# Patient Record
Sex: FEMALE | Race: WHITE | NOT HISPANIC OR LATINO | Employment: FULL TIME | RURAL
[De-identification: names, ages, dates, MRNs, and addresses within clinical notes are randomized per-mention and may not be internally consistent; named-entity substitution may affect disease eponyms.]

---

## 2019-10-26 ENCOUNTER — HISTORICAL (OUTPATIENT)
Dept: ADMINISTRATIVE | Facility: HOSPITAL | Age: 29
End: 2019-10-26

## 2019-10-30 LAB
LAB AP CLINICAL INFORMATION: NORMAL
LAB AP COMMENTS: NORMAL
LAB AP DIAGNOSIS - HISTORICAL: NORMAL
LAB AP GROSS PATHOLOGY - HISTORICAL: NORMAL
LAB AP SPECIMEN SUBMITTED - HISTORICAL: NORMAL

## 2022-05-23 ENCOUNTER — HOSPITAL ENCOUNTER (OUTPATIENT)
Dept: RADIOLOGY | Facility: HOSPITAL | Age: 32
Discharge: HOME | End: 2022-05-23
Attending: NURSE PRACTITIONER
Payer: COMMERCIAL

## 2022-05-23 DIAGNOSIS — R52 PAIN: ICD-10-CM

## 2022-05-23 DIAGNOSIS — R52 PAIN: Primary | ICD-10-CM

## 2022-05-23 PROCEDURE — 73560 X-RAY EXAM OF KNEE 1 OR 2: CPT | Mod: TC,LT

## 2022-09-08 DIAGNOSIS — R10.2 PELVIC PAIN: Primary | ICD-10-CM

## 2022-09-19 ENCOUNTER — HOSPITAL ENCOUNTER (OUTPATIENT)
Dept: RADIOLOGY | Facility: HOSPITAL | Age: 32
Discharge: HOME | End: 2022-09-19
Attending: ADVANCED PRACTICE MIDWIFE
Payer: COMMERCIAL

## 2022-09-19 ENCOUNTER — OFFICE VISIT (OUTPATIENT)
Dept: OBSTETRICS AND GYNECOLOGY | Facility: CLINIC | Age: 32
End: 2022-09-19
Payer: COMMERCIAL

## 2022-09-19 VITALS
SYSTOLIC BLOOD PRESSURE: 119 MMHG | WEIGHT: 212.63 LBS | BODY MASS INDEX: 34.17 KG/M2 | HEIGHT: 66 IN | DIASTOLIC BLOOD PRESSURE: 90 MMHG | HEART RATE: 94 BPM | RESPIRATION RATE: 16 BRPM

## 2022-09-19 DIAGNOSIS — R10.2 PELVIC PAIN: ICD-10-CM

## 2022-09-19 DIAGNOSIS — Z87.42 HISTORY OF OVARIAN CYST: Primary | ICD-10-CM

## 2022-09-19 DIAGNOSIS — Z97.5 PRESENCE OF INTRAUTERINE CONTRACEPTIVE DEVICE (IUD): ICD-10-CM

## 2022-09-19 PROCEDURE — 76856 US EXAM PELVIC COMPLETE: CPT | Mod: 26,,, | Performed by: STUDENT IN AN ORGANIZED HEALTH CARE EDUCATION/TRAINING PROGRAM

## 2022-09-19 PROCEDURE — 76856 US PELVIS COMP WITH TRANSVAG NON-OB (XPD): ICD-10-PCS | Mod: 26,,, | Performed by: STUDENT IN AN ORGANIZED HEALTH CARE EDUCATION/TRAINING PROGRAM

## 2022-09-19 PROCEDURE — 3008F PR BODY MASS INDEX (BMI) DOCUMENTED: ICD-10-PCS | Mod: ,,, | Performed by: ADVANCED PRACTICE MIDWIFE

## 2022-09-19 PROCEDURE — 99213 OFFICE O/P EST LOW 20 MIN: CPT | Mod: PBBFAC,25 | Performed by: ADVANCED PRACTICE MIDWIFE

## 2022-09-19 PROCEDURE — 3080F PR MOST RECENT DIASTOLIC BLOOD PRESSURE >= 90 MM HG: ICD-10-PCS | Mod: ,,, | Performed by: ADVANCED PRACTICE MIDWIFE

## 2022-09-19 PROCEDURE — 76830 US PELVIS COMP WITH TRANSVAG NON-OB (XPD): ICD-10-PCS | Mod: 26,,, | Performed by: STUDENT IN AN ORGANIZED HEALTH CARE EDUCATION/TRAINING PROGRAM

## 2022-09-19 PROCEDURE — 3008F BODY MASS INDEX DOCD: CPT | Mod: ,,, | Performed by: ADVANCED PRACTICE MIDWIFE

## 2022-09-19 PROCEDURE — 3074F PR MOST RECENT SYSTOLIC BLOOD PRESSURE < 130 MM HG: ICD-10-PCS | Mod: ,,, | Performed by: ADVANCED PRACTICE MIDWIFE

## 2022-09-19 PROCEDURE — 76830 TRANSVAGINAL US NON-OB: CPT | Mod: 26,,, | Performed by: STUDENT IN AN ORGANIZED HEALTH CARE EDUCATION/TRAINING PROGRAM

## 2022-09-19 PROCEDURE — 3080F DIAST BP >= 90 MM HG: CPT | Mod: ,,, | Performed by: ADVANCED PRACTICE MIDWIFE

## 2022-09-19 PROCEDURE — 3074F SYST BP LT 130 MM HG: CPT | Mod: ,,, | Performed by: ADVANCED PRACTICE MIDWIFE

## 2022-09-19 PROCEDURE — 1159F MED LIST DOCD IN RCRD: CPT | Mod: ,,, | Performed by: ADVANCED PRACTICE MIDWIFE

## 2022-09-19 PROCEDURE — 99212 PR OFFICE/OUTPT VISIT, EST, LEVL II, 10-19 MIN: ICD-10-PCS | Mod: S$PBB,,, | Performed by: ADVANCED PRACTICE MIDWIFE

## 2022-09-19 PROCEDURE — 99212 OFFICE O/P EST SF 10 MIN: CPT | Mod: S$PBB,,, | Performed by: ADVANCED PRACTICE MIDWIFE

## 2022-09-19 PROCEDURE — 1159F PR MEDICATION LIST DOCUMENTED IN MEDICAL RECORD: ICD-10-PCS | Mod: ,,, | Performed by: ADVANCED PRACTICE MIDWIFE

## 2022-09-19 PROCEDURE — 76830 TRANSVAGINAL US NON-OB: CPT | Mod: TC

## 2022-09-19 RX ORDER — VALACYCLOVIR HYDROCHLORIDE 1 G/1
1000 TABLET, FILM COATED ORAL DAILY
COMMUNITY
Start: 2022-04-11

## 2022-09-19 RX ORDER — FLUTICASONE PROPIONATE 50 MCG
SPRAY, SUSPENSION (ML) NASAL
COMMUNITY
Start: 2022-07-14

## 2022-09-19 RX ORDER — FLUOXETINE HYDROCHLORIDE 40 MG/1
40 CAPSULE ORAL DAILY
COMMUNITY
Start: 2022-09-10

## 2022-09-19 RX ORDER — CLONAZEPAM 0.5 MG/1
TABLET ORAL
COMMUNITY
Start: 2022-08-11

## 2022-09-19 NOTE — PROGRESS NOTES
Subjective:       Patient ID: Claudia Navarro is a 32 y.o. female.    Chief Complaint: Follow-up (Patient is here for pelvic ultrasound results.)    In for follow up ultrasound due to hx mucinous cystadenoma.  Denies complaints today.  Doing well with IUD and reports occasional spotting.  Has an appt with psychology associates for ADHD testing.     Follow-up    Review of Systems   Constitutional: Negative.    HENT: Negative.     Eyes: Negative.    Respiratory: Negative.     Cardiovascular: Negative.    Gastrointestinal: Negative.    Genitourinary: Negative.    Musculoskeletal: Negative.    Integumentary:  Negative.   Neurological: Negative.    Psychiatric/Behavioral:  The patient is nervous/anxious.         High stress levels, anxiety       Objective:      Physical Exam  Vitals reviewed.   Constitutional:       Appearance: Normal appearance.   HENT:      Head: Normocephalic.   Cardiovascular:      Rate and Rhythm: Normal rate and regular rhythm.   Pulmonary:      Effort: Pulmonary effort is normal.   Chest:   Breasts:     Right: Normal. No mass.      Left: Normal. No mass.   Abdominal:      General: Abdomen is flat.      Palpations: Abdomen is soft.   Musculoskeletal:         General: Normal range of motion.      Cervical back: Normal range of motion.   Skin:     General: Skin is warm and dry.   Neurological:      Mental Status: She is alert and oriented to person, place, and time.   Psychiatric:         Mood and Affect: Mood normal.         Behavior: Behavior normal.       Assessment:       Problem List Items Addressed This Visit    None  Visit Diagnoses       History of ovarian cyst    -  Primary    Presence of intrauterine contraceptive device (IUD)                  Plan:       Annual Exam scheduled in December.  Previous records requested from [facility] for review and last pap results.

## 2022-10-17 ENCOUNTER — TELEPHONE (OUTPATIENT)
Dept: OBSTETRICS AND GYNECOLOGY | Facility: CLINIC | Age: 32
End: 2022-10-17
Payer: COMMERCIAL

## 2022-10-17 NOTE — TELEPHONE ENCOUNTER
Tried to call pt again. 10/17/22 4:00p.m.  Called pt back and left a voicemail. ----- Message from Faustina Mac CNM sent at 10/17/2022  9:51 AM CDT -----  Please call and get more info  ----- Message -----  From: Randi Banegas  Sent: 10/17/2022   9:51 AM CDT  To: Faustina Mac CNM    Waiting on a call back since Tuesday  [phone]

## 2023-01-05 ENCOUNTER — TELEPHONE (OUTPATIENT)
Dept: OBSTETRICS AND GYNECOLOGY | Facility: CLINIC | Age: 33
End: 2023-01-05
Payer: COMMERCIAL

## 2023-01-05 NOTE — TELEPHONE ENCOUNTER
----- Message from Randi Banegas sent at 1/4/2023  4:09 PM CST -----  Patient is wanting / waiting on a call about her records being transferred to another dr.     Will you please give her a call at [phone]

## 2024-04-10 ENCOUNTER — OFFICE VISIT (OUTPATIENT)
Dept: FAMILY MEDICINE | Facility: CLINIC | Age: 34
End: 2024-04-10
Payer: COMMERCIAL

## 2024-04-10 VITALS
DIASTOLIC BLOOD PRESSURE: 84 MMHG | BODY MASS INDEX: 34.84 KG/M2 | SYSTOLIC BLOOD PRESSURE: 116 MMHG | HEIGHT: 66 IN | HEART RATE: 103 BPM | WEIGHT: 216.81 LBS

## 2024-04-10 DIAGNOSIS — H90.11 CONDUCTIVE HEARING LOSS OF RIGHT EAR, UNSPECIFIED HEARING STATUS ON CONTRALATERAL SIDE: Chronic | ICD-10-CM

## 2024-04-10 DIAGNOSIS — H91.92 DECREASED HEARING OF LEFT EAR: ICD-10-CM

## 2024-04-10 DIAGNOSIS — H65.92 SOM (SECRETORY OTITIS MEDIA), LEFT: Primary | ICD-10-CM

## 2024-04-10 PROCEDURE — 3074F SYST BP LT 130 MM HG: CPT | Mod: ,,, | Performed by: FAMILY MEDICINE

## 2024-04-10 PROCEDURE — 99203 OFFICE O/P NEW LOW 30 MIN: CPT | Mod: ,,, | Performed by: FAMILY MEDICINE

## 2024-04-10 PROCEDURE — 3079F DIAST BP 80-89 MM HG: CPT | Mod: ,,, | Performed by: FAMILY MEDICINE

## 2024-04-10 PROCEDURE — 1159F MED LIST DOCD IN RCRD: CPT | Mod: ,,, | Performed by: FAMILY MEDICINE

## 2024-04-10 PROCEDURE — 3008F BODY MASS INDEX DOCD: CPT | Mod: ,,, | Performed by: FAMILY MEDICINE

## 2024-04-10 PROCEDURE — 1160F RVW MEDS BY RX/DR IN RCRD: CPT | Mod: ,,, | Performed by: FAMILY MEDICINE

## 2024-04-10 RX ORDER — DEXAMETHASONE 0.75 MG/1
TABLET ORAL
Qty: 15 TABLET | Refills: 0 | Status: SHIPPED | OUTPATIENT
Start: 2024-04-10

## 2024-04-10 RX ORDER — GUAIFENESIN, PSEUDOEPHEDRINE HYDROCHLORIDE 600; 60 MG/1; MG/1
1 TABLET, EXTENDED RELEASE ORAL 2 TIMES DAILY
Qty: 18 TABLET | Refills: 0 | Status: SHIPPED | OUTPATIENT
Start: 2024-04-10 | End: 2024-05-10

## 2024-04-10 RX ORDER — FLUTICASONE PROPIONATE 50 MCG
2 SPRAY, SUSPENSION (ML) NASAL DAILY
Qty: 16 G | Refills: 0 | Status: SHIPPED | OUTPATIENT
Start: 2024-04-10 | End: 2024-05-10

## 2024-04-10 RX ORDER — DEXTROAMPHETAMINE SACCHARATE, AMPHETAMINE ASPARTATE, DEXTROAMPHETAMINE SULFATE AND AMPHETAMINE SULFATE 7.5; 7.5; 7.5; 7.5 MG/1; MG/1; MG/1; MG/1
1 TABLET ORAL 2 TIMES DAILY
COMMUNITY
Start: 2024-03-15

## 2024-04-10 NOTE — PROGRESS NOTES
Yinka Vicente MD   [facility]  [facility] [address] [address]  [phone]      PATIENT NAME: Claudia Navarro  : 1990  DATE: 4/10/24  MRN: [id number]      Billing Provider: Yinka Vicente MD  Level of Service:   Patient PCP Information       Provider PCP Type    Yinka Vicente MD General            Reason for Visit / Chief Complaint: decreased hearing (Complains of decreased hearing, left ear. Denies otalgia. )       Update PCP  Update Chief Complaint         History of Present Illness / Problem Focused Workflow     Claudia Navarro presents to the clinic with decreased hearing (Complains of decreased hearing, left ear. Denies otalgia. )       35 yo WF who reports that she had COVID or flu back around [place] .  Ever since then she has had problems with pressure and decreased hearing in left ear.  She has a history of problems in her right ear and has had surgery in the past.  She has conductive hearing loss and wears a hearing aid in R ear.  She reports that her left ear usually does not give her any problems.  She has tried mucinex, nasal decongestant spray, antibiotic ear drops and prednisone tablets.  Has not gotten any relief.          Review of Systems     Review of Systems   Constitutional:  Negative for activity change, chills and fever.   HENT:  Positive for nasal congestion and ear pain (pressure in left ear). Negative for sore throat and tinnitus.         Decreased hearing in left ear (subacute)  Decreased hearing right ear (chronic)   Eyes:  Negative for pain.   Respiratory:  Negative for cough, chest tightness and shortness of breath.    Cardiovascular:  Negative for chest pain and palpitations.   Gastrointestinal:  Negative for abdominal pain.   Neurological:  Negative for dizziness, syncope and weakness.   Psychiatric/Behavioral:  Negative for confusion.         Medical / Social / Family History      Past Medical History:   Diagnosis Date    Does use hearing aid     Mucinous cystadenoma of ovary     PCOS (polycystic ovarian syndrome)        Past Surgical History:   Procedure Laterality Date     SECTION         Social History    reports that she has never smoked. She has never used smokeless tobacco. She reports that she does not currently use alcohol. She reports that she does not use drugs.   Social History     Tobacco Use    Smoking status: Never    Smokeless tobacco: Never   Substance Use Topics    Alcohol use: Not Currently    Drug use: Never       Family History  Family History   Problem Relation Age of Onset    Schizophrenia Maternal Grandmother     Schizophrenia Maternal Grandfather     Bipolar disorder Father     Anxiety disorder Father     Hypertension Mother     Bipolar disorder Mother     Schizophrenia Mother        Medications and Allergies     Medications  Outpatient Medications Marked as Taking for the 4/10/24 encounter (Office Visit) with Yinka Vicente MD   Medication Sig Dispense Refill    clonazePAM (KLONOPIN) 0.5 MG tablet TAKE ONE TABLET BY MOUTH EVERY TWELVE HOURS AS NEEDED FOR ANXIETY      dextroamphetamine-amphetamine 30 mg Tab Take 1 tablet by mouth 2 (two) times daily.      FLUoxetine 40 MG capsule Take 40 mg by mouth once daily.      fluticasone propionate (FLONASE) 50 mcg/actuation nasal spray SPRAY ONE SPRAY into each nostril daily      meloxicam (MOBIC) 7.5 MG tablet Take 1 tablet (7.5 mg total) by mouth once daily. 30 tablet 1    valACYclovir (VALTREX) 1000 MG tablet Take 1,000 mg by mouth once daily.         Allergies  Review of patient's allergies indicates:   Allergen Reactions    Shellfish containing products Other (See Comments)     Can use Betadine on skin       Physical Examination     Vitals:    04/10/24 1320   BP: 116/84   Pulse: 103     Physical Exam  Vitals reviewed.   Constitutional:       Appearance: Normal appearance.   HENT:      Head: Normocephalic  and atraumatic.      Ears:      Comments: ZULLY on left  Eyes:      Extraocular Movements: Extraocular movements intact.      Conjunctiva/sclera: Conjunctivae normal.      Pupils: Pupils are equal, round, and reactive to light.   Cardiovascular:      Rate and Rhythm: Normal rate and regular rhythm.      Heart sounds: Normal heart sounds.   Pulmonary:      Effort: Pulmonary effort is normal.      Breath sounds: Normal breath sounds.   Musculoskeletal:         General: Normal range of motion.      Cervical back: Normal range of motion.   Skin:     General: Skin is warm and dry.   Neurological:      General: No focal deficit present.      Mental Status: She is alert and oriented to person, place, and time.   Psychiatric:         Mood and Affect: Mood normal.         Behavior: Behavior normal.          Assessment and Plan (including Health Maintenance)      Problem List  Smart Sets  Document Outside HM   :    Plan:         Health Maintenance Due   Topic Date Due    Hepatitis C Screening  Never done    Cervical Cancer Screening  Never done    Lipid Panel  Never done    HIV Screening  Never done    TETANUS VACCINE  Never done    Influenza Vaccine (1) Never done    COVID-19 Vaccine (3 - 2023-24 season) 09/01/2023    Hemoglobin A1c (Diabetic Prevention Screening)  Never done       Problem List Items Addressed This Visit          ENT    Conductive hearing loss of right ear     Other Visit Diagnoses       ZULLY (secretory otitis media), left    -  Primary    Relevant Medications    pseudoephedrine-guaiFENesin  mg (MUCINEX D)  mg per tablet    fluticasone propionate (FLONASE) 50 mcg/actuation nasal spray    dexAMETHasone (DECADRON) 0.75 MG Tab    Decreased hearing of left ear                The patient has no Health Maintenance topics of status Not Due    Future Appointments   Date Time Provider Department Center   4/10/2024  2:30 PM Yinka Vicente MD [facility]   4/18/2024  1:30 PM Zoe Rios,  MD [facility]            Signature:  MD [facility]  [facility] [address] [address]  [phone]    Date of encounter: 4/10/24

## 2024-04-18 ENCOUNTER — OFFICE VISIT (OUTPATIENT)
Dept: OBSTETRICS AND GYNECOLOGY | Facility: CLINIC | Age: 34
End: 2024-04-18
Payer: COMMERCIAL

## 2024-04-18 VITALS
BODY MASS INDEX: 34.55 KG/M2 | WEIGHT: 215 LBS | HEART RATE: 78 BPM | SYSTOLIC BLOOD PRESSURE: 120 MMHG | HEIGHT: 66 IN | DIASTOLIC BLOOD PRESSURE: 86 MMHG

## 2024-04-18 DIAGNOSIS — Z01.419 WELL WOMAN EXAM WITH ROUTINE GYNECOLOGICAL EXAM: Primary | ICD-10-CM

## 2024-04-18 DIAGNOSIS — Z12.4 SCREENING FOR MALIGNANT NEOPLASM OF THE CERVIX: ICD-10-CM

## 2024-04-18 DIAGNOSIS — N83.209 CYST OF OVARY, UNSPECIFIED LATERALITY: ICD-10-CM

## 2024-04-18 PROCEDURE — 88142 CYTOPATH C/V THIN LAYER: CPT | Mod: TC,GCY | Performed by: OBSTETRICS & GYNECOLOGY

## 2024-04-18 PROCEDURE — 99213 OFFICE O/P EST LOW 20 MIN: CPT | Mod: PBBFAC | Performed by: OBSTETRICS & GYNECOLOGY

## 2024-04-18 PROCEDURE — 3008F BODY MASS INDEX DOCD: CPT | Mod: ,,, | Performed by: OBSTETRICS & GYNECOLOGY

## 2024-04-18 PROCEDURE — 3079F DIAST BP 80-89 MM HG: CPT | Mod: ,,, | Performed by: OBSTETRICS & GYNECOLOGY

## 2024-04-18 PROCEDURE — 1160F RVW MEDS BY RX/DR IN RCRD: CPT | Mod: ,,, | Performed by: OBSTETRICS & GYNECOLOGY

## 2024-04-18 PROCEDURE — 1159F MED LIST DOCD IN RCRD: CPT | Mod: ,,, | Performed by: OBSTETRICS & GYNECOLOGY

## 2024-04-18 PROCEDURE — 3074F SYST BP LT 130 MM HG: CPT | Mod: ,,, | Performed by: OBSTETRICS & GYNECOLOGY

## 2024-04-18 PROCEDURE — 87624 HPV HI-RISK TYP POOLED RSLT: CPT | Mod: ,,, | Performed by: CLINICAL MEDICAL LABORATORY

## 2024-04-18 PROCEDURE — 99395 PREV VISIT EST AGE 18-39: CPT | Mod: S$GLB,,, | Performed by: OBSTETRICS & GYNECOLOGY

## 2024-04-18 PROCEDURE — 99459 PELVIC EXAMINATION: CPT | Mod: S$GLB,,, | Performed by: OBSTETRICS & GYNECOLOGY

## 2024-04-19 LAB
GH SERPL-MCNC: NORMAL NG/ML
INSULIN SERPL-ACNC: NORMAL U[IU]/ML
LAB AP CLINICAL INFORMATION: NORMAL
LAB AP GYN INTERPRETATION: NEGATIVE
LAB AP PAP DISCLAIMER COMMENTS: NORMAL
RENIN PLAS-CCNC: NORMAL NG/ML/H

## 2024-04-22 ENCOUNTER — TELEPHONE (OUTPATIENT)
Dept: OBSTETRICS AND GYNECOLOGY | Facility: CLINIC | Age: 34
End: 2024-04-22
Payer: COMMERCIAL

## 2024-04-22 DIAGNOSIS — N76.0 BACTERIAL VAGINOSIS: Primary | ICD-10-CM

## 2024-04-22 DIAGNOSIS — B96.89 BACTERIAL VAGINOSIS: Primary | ICD-10-CM

## 2024-04-22 RX ORDER — TINIDAZOLE 500 MG/1
2 TABLET ORAL ONCE
Qty: 4 TABLET | Refills: 0 | Status: SHIPPED | OUTPATIENT
Start: 2024-04-22 | End: 2024-04-22

## 2024-04-22 NOTE — TELEPHONE ENCOUNTER
Left message for pt to return call to clinic.       ----- Message from Zoe Rios MD sent at 4/22/2024  9:26 AM CDT -----  Please tell her that her PAP is negative, but she has BV. I'm sending medication to her pharmacy.

## 2024-04-23 LAB
HPV 16: NEGATIVE
HPV 18: NEGATIVE
HPV OTHER: NEGATIVE

## 2024-04-24 ENCOUNTER — TELEPHONE (OUTPATIENT)
Dept: OBSTETRICS AND GYNECOLOGY | Facility: CLINIC | Age: 34
End: 2024-04-24
Payer: COMMERCIAL

## 2024-04-24 NOTE — TELEPHONE ENCOUNTER
----- Message from Nathalie Waldrop sent at 4/24/2024  8:12 AM CDT -----  PT. TRY TO RETURN YOUR CALL

## 2024-04-24 NOTE — TELEPHONE ENCOUNTER
Pt returned call to clinic; stated she took the medication. Advised pt to call clinic if anything else is needed.

## 2024-05-08 ENCOUNTER — OFFICE VISIT (OUTPATIENT)
Dept: OBSTETRICS AND GYNECOLOGY | Facility: CLINIC | Age: 34
End: 2024-05-08
Payer: COMMERCIAL

## 2024-05-08 VITALS
WEIGHT: 215 LBS | RESPIRATION RATE: 19 BRPM | HEIGHT: 66 IN | BODY MASS INDEX: 34.55 KG/M2 | OXYGEN SATURATION: 100 % | HEART RATE: 88 BPM | DIASTOLIC BLOOD PRESSURE: 78 MMHG | SYSTOLIC BLOOD PRESSURE: 116 MMHG

## 2024-05-08 DIAGNOSIS — R63.5 WEIGHT GAIN: ICD-10-CM

## 2024-05-08 DIAGNOSIS — D27.9 MUCINOUS CYSTADENOMA OF OVARY, UNSPECIFIED LATERALITY: ICD-10-CM

## 2024-05-08 DIAGNOSIS — Z87.42 HISTORY OF OVARIAN CYST: Primary | ICD-10-CM

## 2024-05-08 DIAGNOSIS — R09.89 UPPER RESPIRATORY SYMPTOM: ICD-10-CM

## 2024-05-08 DIAGNOSIS — R53.83 FATIGUE, UNSPECIFIED TYPE: ICD-10-CM

## 2024-05-08 PROCEDURE — 3008F BODY MASS INDEX DOCD: CPT | Mod: S$GLB,,, | Performed by: ADVANCED PRACTICE MIDWIFE

## 2024-05-08 PROCEDURE — 1159F MED LIST DOCD IN RCRD: CPT | Mod: S$GLB,,, | Performed by: ADVANCED PRACTICE MIDWIFE

## 2024-05-08 PROCEDURE — 3074F SYST BP LT 130 MM HG: CPT | Mod: S$GLB,,, | Performed by: ADVANCED PRACTICE MIDWIFE

## 2024-05-08 PROCEDURE — 99213 OFFICE O/P EST LOW 20 MIN: CPT | Mod: S$GLB,,, | Performed by: ADVANCED PRACTICE MIDWIFE

## 2024-05-08 PROCEDURE — 3078F DIAST BP <80 MM HG: CPT | Mod: S$GLB,,, | Performed by: ADVANCED PRACTICE MIDWIFE

## 2024-05-08 PROCEDURE — 99214 OFFICE O/P EST MOD 30 MIN: CPT | Mod: PBBFAC | Performed by: ADVANCED PRACTICE MIDWIFE

## 2024-05-08 NOTE — PROGRESS NOTES
Subjective     Patient ID: Claudia Navarro is a 34 y.o. female.    Chief Complaint: Ovarian Cyst (In for a US on her cyst and to get labs.)    Hx of ovarian mucinous cystadenoma.  History of PCOS.   C/o ear, nose, throat, and cough and sinus symptoms since Dec. Drainage is green. Tried numerous medications for this, antibiotics, steroids , cough medicine and nasal spray.   Seeing ENT next week  for further evaluation.  Hx of hearing loss and has hearing aid in right ear.   Rash to left arm , small bumps with itching.   C/o fatigue, weight gain, low energy, bloating.   Had pap done with Dr Rios last month with Neg/Neg results.        Gynecologic Exam  The patient's pertinent negatives include no genital itching, genital lesions, genital odor, genital rash, missed menses, pelvic pain, vaginal bleeding or vaginal discharge. She is not pregnant. Associated symptoms include anorexia, back pain and a sore throat. Pertinent negatives include no abdominal pain, chills, constipation, diarrhea, discolored urine, dysuria, fever, flank pain, frequency, headaches, hematuria, nausea, painful intercourse, rash, urgency or vomiting. There has been no bleeding. She has not been passing clots. She has not been passing tissue. She is sexually active. No, her partner does not have an STD. She uses an IUD for contraception. Her past medical history is significant for a  section, herpes simplex, ovarian cysts and vaginosis.     Review of Systems   Constitutional:  Positive for unexpected weight change. Negative for chills and fever.   HENT:  Positive for nasal congestion, hearing loss, rhinorrhea, sinus pressure/congestion, sneezing and sore throat.    Eyes: Negative.    Respiratory:  Positive for cough.    Gastrointestinal:  Positive for anorexia. Negative for abdominal pain, constipation, diarrhea, nausea and vomiting.   Endocrine: Negative.    Genitourinary:  Negative for dysuria, flank pain, frequency, hematuria, missed  menses, pelvic pain, urgency and vaginal discharge.   Musculoskeletal:  Positive for back pain.   Integumentary:  Negative for rash.   Allergic/Immunologic: Negative.    Neurological:  Negative for headaches.   Hematological: Negative.    Psychiatric/Behavioral: Negative.            Objective     Physical Exam  Vitals reviewed.   Constitutional:       Appearance: Normal appearance.   HENT:      Head: Normocephalic.   Cardiovascular:      Rate and Rhythm: Normal rate.   Pulmonary:      Effort: Pulmonary effort is normal.   Musculoskeletal:         General: Normal range of motion.      Cervical back: Normal range of motion.   Skin:     General: Skin is warm and dry.   Neurological:      Mental Status: She is alert and oriented to person, place, and time.   Psychiatric:         Mood and Affect: Mood normal.         Behavior: Behavior normal.        Assessment and Plan     1. History of ovarian cyst  -     US Pelvis Complete Non OB; Future; Expected date: 05/15/2024    2. Mucinous cystadenoma of ovary, unspecified laterality    3. Upper respiratory symptom    4. Weight gain  -     Vitamin D; Future; Expected date: 05/08/2024  -     TSH; Future; Expected date: 05/08/2024  -     T4, Free; Future; Expected date: 05/08/2024  -     Vitamin B12 & Folate; Future; Expected date: 05/08/2024  -     CBC Auto Differential; Future; Expected date: 05/08/2024  -     Comprehensive Metabolic Panel; Future; Expected date: 05/08/2024  -     Cortisol; Future; Expected date: 05/08/2024    5. Fatigue, unspecified type  -     Vitamin D; Future; Expected date: 05/08/2024  -     TSH; Future; Expected date: 05/08/2024  -     T4, Free; Future; Expected date: 05/08/2024  -     Vitamin B12 & Folate; Future; Expected date: 05/08/2024  -     CBC Auto Differential; Future; Expected date: 05/08/2024  -     Comprehensive Metabolic Panel; Future; Expected date: 05/08/2024  -     Cortisol; Future; Expected date: 05/08/2024  -     Magnesium; Future;  Expected date: 05/08/2024  -     Ferritin; Future; Expected date: 05/08/2024  -     Iron and TIBC; Future; Expected date: 05/08/2024      Pelvic US ordered  Labs ordered  Keep f/u appt with ENT for further evaluation       Follow up if symptoms worsen or fail to improve.

## 2024-05-10 NOTE — PROGRESS NOTES
"  Annual Gynecologic Exam    Assessment/Plan:  34 y.o.  presenting for her annual exam:    Problem List Items Addressed This Visit    None  Visit Diagnoses       Well woman exam with routine gynecological exam    -  Primary    Relevant Orders    ThinPrep Pap Test (Completed)    HPV DNA probe, amplified (Completed)    Screening for malignant neoplasm of the cervix        Relevant Orders    ThinPrep Pap Test (Completed)    HPV DNA probe, amplified (Completed)    Cyst of ovary, unspecified laterality        Relevant Orders    US Pelvis Complete Non OB          Annual exam with PAP performed.  RTC in 2 weeks to see Faustina Mac and to repeat TVUS.     RTC in 1 year for annual exam with Faustina Mac.    Health Maintenance:    Birth control: Nexplanon.  Pregnancy plans: None   Safe relationship: Yes  PCP: See below     Screening:  Last pap smear: "years ago"  History of abnormal pap smears: Denies      CC:   Chief Complaint   Patient presents with    Well Woman     Pt states no concerns; is a Faustina Mac pt but accidentally scheduled to Dr. Rios. Would like to keep an eye on the cysts she was dx with.       HPI:  34 y.o.  presents for her gynecologic annual exam. She is doing well today. It has been a few years since her last PAP. She has a h/o an ovarian cyst that she would like to follow up on. She denies any current pelvic or abdominal pain. She is using the Nexplanon for contraception and this is working well. She has occasional spotting on this.    Review of Systems: The following ROS was otherwise negative, except as noted in the HPI:  constitutional, HEENT, respiratory, cardiovascular, gastrointestinal, genitourinary, skin, musculoskeletal, neurological, psych    Primary Care Physician: Jim West, NP    Obstetric History  OB History    Para Term  AB Living   1 1       1   SAB IAB Ectopic Multiple Live Births                  # Outcome Date GA Lbr Salo/2nd Weight Sex Type Anes PTL " Lv   1 Para                Gynecologic History       Social History     Substance and Sexual Activity   Sexual Activity Yes    Partners: Male    Birth control/protection: Implant                   Menstrual History:   LMP: ?    Age of Menarche: 15   Menstrual Period: regular   Interval Between Menses: monthly (normally, but has essentially amenorrhea on the Nexplanon.)   Duration of Menses: 1-2 days   Menstrual Flow: minimal   Bleeding between menses: No    Sexual History:    Contraception: see above   Currently is sexually active   Denies history of STIs   Denies sexual problems    Pap History:   History of abnormal pap smears: see above   Last pap: see above    Medical History:  Past Medical History:   Diagnosis Date    Amenorrhea     Since teenage years    Anxiety     Teenage years    Depression 2018    Does use hearing aid     Infertility, female 2016    Mucinous cystadenoma of ovary     PCOS (polycystic ovarian syndrome)        Medications:  Medication List with Changes/Refills   Current Medications    CLONAZEPAM (KLONOPIN) 0.5 MG TABLET    TAKE ONE TABLET BY MOUTH EVERY TWELVE HOURS AS NEEDED FOR ANXIETY    DEXAMETHASONE (DECADRON) 0.75 MG TAB    2 tablets PO in AM and 1 tablet PO in PM    DEXTROAMPHETAMINE-AMPHETAMINE 30 MG TAB    Take 1 tablet by mouth 2 (two) times daily.    FLUOXETINE 40 MG CAPSULE    Take 40 mg by mouth once daily.    VALACYCLOVIR (VALTREX) 1000 MG TABLET    Take 1,000 mg by mouth once daily.   Discontinued Medications    FLUTICASONE PROPIONATE (FLONASE) 50 MCG/ACTUATION NASAL SPRAY    SPRAY ONE SPRAY into each nostril daily    FLUTICASONE PROPIONATE (FLONASE) 50 MCG/ACTUATION NASAL SPRAY    2 sprays (100 mcg total) by Each Nostril route once daily.    MELOXICAM (MOBIC) 7.5 MG TABLET    Take 1 tablet (7.5 mg total) by mouth once daily.    PSEUDOEPHEDRINE-GUAIFENESIN  MG (MUCINEX D)  MG PER TABLET    Take 1 tablet by mouth 2 (two) times daily. for 10 days       Surgical  "History:  Past Surgical History:   Procedure Laterality Date     SECTION         Allergies:  Review of patient's allergies indicates:   Allergen Reactions    Shellfish containing products Other (See Comments)     Can use Betadine on skin       Family History:  Family History   Problem Relation Name Age of Onset    Schizophrenia Maternal Grandmother      Schizophrenia Maternal Grandfather      Bipolar disorder Father      Anxiety disorder Father      Hypertension Mother Shraddha Burnett     Bipolar disorder Mother Shraddha Burnett     Schizophrenia Mother Shraddha Burnett     Seizures Sister Josephine Powers        Social History:  Social History     Socioeconomic History    Marital status:    Tobacco Use    Smoking status: Never    Smokeless tobacco: Never   Substance and Sexual Activity    Alcohol use: Not Currently    Drug use: Never    Sexual activity: Yes     Partners: Male     Birth control/protection: Implant       Objective:  Body mass index is 34.7 kg/m².    /86 (BP Location: Right arm, Patient Position: Sitting)   Pulse 78   Ht 5' 6" (1.676 m)   Wt 97.5 kg (215 lb)   BMI 34.70 kg/m²     General: Alert, well appearing, no acute distress  Head: Normocephalic, atraumatic  Breasts: Symmetric, non-tender to palpation, no skin changes, palpable axillary lymph nodes or masses noted  Lungs: Unlabored respirations. Clear to auscultation bilaterally.  Cardiovascular: Regular rate and rhythm.  Abdomen: Soft, nontender, nondistended   Pelvic: Exam chaperoned by female assistant.  External: normal female genitalia, no masses or lesions   Vagina: moist, pink mucosa with rugae, physiologic discharge  Cervix: no masses or lesions, nontender. PAP performed.  Uterus: approx 6 weeks, mobile, midline, nontender  Adnexa: no masses or fullness, nontender  Rectovaginal: deferred  Extremities: No redness or tenderness  Skin: Well perfused, normal coloration and turgor, no lesions or rashes visualized  Neuro: Alert, " oriented, normal speech, no focal deficits, moves extremities appropriately  Psych: Normal mood and behavior.

## 2024-05-14 ENCOUNTER — PATIENT MESSAGE (OUTPATIENT)
Dept: OBSTETRICS AND GYNECOLOGY | Facility: CLINIC | Age: 34
End: 2024-05-14
Payer: COMMERCIAL

## 2024-05-14 ENCOUNTER — HOSPITAL ENCOUNTER (OUTPATIENT)
Dept: RADIOLOGY | Facility: HOSPITAL | Age: 34
Discharge: HOME | End: 2024-05-14
Attending: ADVANCED PRACTICE MIDWIFE
Payer: COMMERCIAL

## 2024-05-14 DIAGNOSIS — Z87.42 HISTORY OF OVARIAN CYST: ICD-10-CM

## 2024-05-14 PROCEDURE — 76856 US EXAM PELVIC COMPLETE: CPT | Mod: 26,,, | Performed by: RADIOLOGY

## 2024-05-14 PROCEDURE — 76856 US EXAM PELVIC COMPLETE: CPT | Mod: TC

## 2024-05-16 ENCOUNTER — PATIENT MESSAGE (OUTPATIENT)
Dept: OBSTETRICS AND GYNECOLOGY | Facility: CLINIC | Age: 34
End: 2024-05-16
Payer: COMMERCIAL